# Patient Record
(demographics unavailable — no encounter records)

---

## 2024-11-01 NOTE — DISCUSSION/SUMMARY
[FreeTextEntry1] : This is a 64-year-old male with past medical history significant for bicuspid aortic with moderate aortic valve regurgitation,  enlarged proximal aortic root, mild mitral valve regurgitation, hypertension, occasional dyspnea on exertion, status post aortic valve replacement, and aortic root replacement with bovine prosthesis and endovascular graft, at [facility] In 8/2018, status post recent reduction of his prostate due to BPH (laser electrocautery), who comes in for followup cardiac evaluation.  He denies chest pain, shortness of breath, dizziness or syncope. Exercise stress test done November 1, 2024 demonstrated no evidence of myocardial ischemia; the patient did have an elevated blood pressure response to exercise. I have recommended he discontinue his lisinopril 10 mg daily and start on Micardis 40 mg daily for more sustained blood pressure effect. He is instructed limiting salt intake. The patient had normal exercise stress test November 28, 2023. Echo Doppler examination done November 14, 2023 demonstrated mild mitral valve regurgitation, mild tricuspid valve regurgitation, minimal pulmonic valve regurgitation, left atrial enlargement and borderline concentric left ventricular hypertrophy, normal left ventricular ejection fraction of 63% with a bioprosthetic aortic valve present. Lipid panel done June 19, 2023 demonstrated a cholesterol 136, HDL 43, triglycerides of 190, LDL direct 74, non-HDL cholesterol 93 mg/dL. Patient will continue on his current dose of Crestor 20 mg daily The patient had a normal exercise stress test June 11, 2021. Echo Doppler examination done March 12, 2021 demonstrated mild mitral valve regurgitation, minimal tricuspid valve regurgitation, mild concentric left ventricular hypertrophy, normally functioning bioprosthetic aortic valve with normal left ventricular ejection fraction 66%. He will continue on his current dose of lisinopril 10 mg daily, amlodipine 10 mg, aspirin 81 mg, and Toprol-XL 50 mg/dayincluding Toprol-XL 50 mg/day, lisinopril 10 mg daily, Norvasc 10 mg/day, and Crestor 20 mg/day. The patient has some keloid formation at the sternotomy site. The patient is aware that he must have antibiotic prophylaxis for dental procedures and other invasive procedures. He will have new blood work done today for electrolytes, lipid panel, hemoglobin A1c and SMA 20.  He will follow up with you regarding his medical care.  The patient understands that aerobic exercises must be increased to 40 minutes 4 times per week. A detailed discussion of lifestyle modification was done today. The patient has a good understanding of the diagnosis, and treatment plan. Lifestyle modification was also outlined.

## 2024-11-01 NOTE — PHYSICAL EXAM
[General Appearance - Well Developed] : well developed [Normal Appearance] : normal appearance [Well Groomed] : well groomed [General Appearance - Well Nourished] : well nourished [No Deformities] : no deformities [General Appearance - In No Acute Distress] : no acute distress [Normal Oral Mucosa] : normal oral mucosa [Normal Oropharynx] : normal oropharynx [Normal Jugular Venous A Waves Present] : normal jugular venous A waves present [Normal Jugular Venous V Waves Present] : normal jugular venous V waves present [No Jugular Venous Ng A Waves] : no jugular venous ng A waves [Respiration, Rhythm And Depth] : normal respiratory rhythm and effort [Exaggerated Use Of Accessory Muscles For Inspiration] : no accessory muscle use [Auscultation Breath Sounds / Voice Sounds] : lungs were clear to auscultation bilaterally [Bowel Sounds] : normal bowel sounds [Abdomen Soft] : soft [Abnormal Walk] : normal gait [Gait - Sufficient For Exercise Testing] : the gait was sufficient for exercise testing [Nail Clubbing] : no clubbing of the fingernails [Cyanosis, Localized] : no localized cyanosis [Skin Color & Pigmentation] : normal skin color and pigmentation [Skin Turgor] : normal skin turgor [] : no rash [Affect] : the affect was normal [Oriented To Time, Place, And Person] : oriented to person, place, and time [Mood] : the mood was normal [No Anxiety] : not feeling anxious [II] : a grade 2 [Well Developed] : well developed [Well Nourished] : well nourished [No Acute Distress] : no acute distress [Normal Conjunctiva] : normal conjunctiva [Normal Venous Pressure] : normal venous pressure [No Carotid Bruit] : no carotid bruit [Normal S1, S2] : normal S1, S2 [No Rub] : no rub [No Gallop] : no gallop [5th Left ICS - MCL] : palpated at the 5th LICS in the midclavicular line [Normal] : normal [No Precordial Heave] : no precordial heave was noted [Normal Rate] : normal [Rhythm Regular] : regular [Normal S1] : normal S1 [Normal S2] : normal S2 [I] : a grade 1 [No Pitting Edema] : no pitting edema present [2+] : left 2+ [No Abnormalities] : the abdominal aorta was not enlarged and no bruit was heard [Clear Lung Fields] : clear lung fields [Good Air Entry] : good air entry [No Respiratory Distress] : no respiratory distress  [Soft] : abdomen soft [Non Tender] : non-tender [No Masses/organomegaly] : no masses/organomegaly [Normal Bowel Sounds] : normal bowel sounds [Normal Gait] : normal gait [No Edema] : no edema [No Cyanosis] : no cyanosis [No Clubbing] : no clubbing [No Varicosities] : no varicosities [No Rash] : no rash [No Skin Lesions] : no skin lesions [Moves all extremities] : moves all extremities [No Focal Deficits] : no focal deficits [Normal Speech] : normal speech [Alert and Oriented] : alert and oriented [Normal memory] : normal memory [FreeTextEntry1] : healed sternotomy wound [Apical Thrill] : no thrill palpable at the apex [S3] : no S3 [S4] : no S4 [Right Carotid Bruit] : no bruit heard over the right carotid [Left Carotid Bruit] : no bruit heard over the left carotid [Right Femoral Bruit] : no bruit heard over the right femoral artery [Left Femoral Bruit] : no bruit heard over the left femoral artery

## 2024-11-01 NOTE — REASON FOR VISIT
[CV Risk Factors and Non-Cardiac Disease] : CV risk factors and non-cardiac disease [Structural Heart and Valve Disease] : structural heart and valve disease [Follow-Up - Clinic] : a clinic follow-up of [Hyperlipidemia] : hyperlipidemia [Hypertension] : hypertension [Mitral Regurgitation] : mitral regurgitation [FreeTextEntry3] : Dr. Jensen [FreeTextEntry1] : Bicuspid AV, s/p AVR & aortic root replacement, murmur

## 2024-11-01 NOTE — ASSESSMENT
[FreeTextEntry1] : This is a 63-year-old male with past medical history significant for bicuspid aortic with moderate aortic valve regurgitation, enlarged proximal aortic root, mild mitral valve regurgitation, hypertension, occasional dyspnea on exertion, s/p aortic valve replacement and aortic root replacement with bovine prosthesis and endovascular graft at [facility] in August 2018 who comes in for follow-up cardiac evaluation.  HPI: He is feeling generally well today and denies chest pain, dizziness, heart palpitations, recent episodes of syncope or falls, SOB, or dyspnea at this time. He presents today for a cardiac clearance for scheduled prostate biopsy on 03/202/2024.   Current Medications: Lisinopril 10 mg daily, Amlodipine 10 mg, Aspirin 81 mg, Fish Oil 100 mg daily, Metoprolol Succinate 50 mg daily, and Crestor 20 mg daily   BLOOD PRESSURE: Controlled    BLOOD WORK: -Lipid panel done November 2023 demonstrated triglyceride 237, cholesterol 194, , non-, LDL direct 120. -Lipid panel done June 19, 2023 demonstrated a cholesterol 136, HDL 43, triglycerides of 190, LDL direct 74, non-HDL cholesterol 93 mg/dL.   TESTING/REPORTS: -EKG done 03/06/2024 demonstrated sinus bradycardia rate 51 BPM otherwise remarkable for nonspecific ST-T wave changes.   -The patient had normal exercise stress test November 28, 2023.  -Echo Doppler examination done November 14, 2023 demonstrated mild mitral valve regurgitation, mild tricuspid valve regurgitation, minimal pulmonic valve regurgitation, left atrial enlargement and borderline concentric left ventricular hypertrophy, normal left ventricular ejection fraction of 63% with a bioprosthetic aortic valve present.  -The patient had a normal exercise stress test June 11, 2021.  -Echo Doppler examination done March 12, 2021 demonstrated mild mitral valve regurgitation, minimal tricuspid valve regurgitation, mild concentric left ventricular hypertrophy, normally functioning bioprosthetic aortic valve with normal left ventricular ejection fraction 66%.   *** The patient is clear from a cardiac standpoint. Echocardiogram done November 2023 with ejection fraction 63%. Please avoid overhydration. The patient should be allowed to take their usual medications in the perioperative period. Maintain proper DVT prophylaxis. The patient should have an incentive spirometer in the perioperative period. He may stop his Fish Oil 100 mg daily and Aspirin 81 mg daily 7 days prior to the surgery. Prophylactic antibiotic therapy in the perioperative period to be determined at the discretion of the performing surgeon due to history of aortic valve replacement/aortic root replacement***    PLAN: -He will continue with his usual medications and will contact the office if he is having any complaints between now and their next follow up appointment.   I have discussed the plan of care with JESICA SILVARONE and he will follow up in 3 months. They are compliant with all of their medications.   The patient understands that aerobic exercises must be increased to 40 minutes 4 times/week and a detailed discussion of lifestyle modification was done today.   The patient has a good understanding of the diagnosis, treatment plan and lifestyle modification.   They will contact me at the office for any questions with their care or any changes in their health status.   The patient was discussed with supervision physician Dr. Celso Crawford at the time of the visit and the plan of care will be carried out as noted above.     JUAN MANUEL Lobo NP
Additional Notes: Pared by RN
Detail Level: Simple
Additional Notes: Biopsy results state AK.

## 2025-04-09 NOTE — PHYSICAL EXAM
[FreeTextEntry1] : healed sternotomy wound [Apical Thrill] : no thrill palpable at the apex [S3] : no S3 [S4] : no S4 [Right Carotid Bruit] : no bruit heard over the right carotid [Left Carotid Bruit] : no bruit heard over the left carotid [Right Femoral Bruit] : no bruit heard over the right femoral artery [Left Femoral Bruit] : no bruit heard over the left femoral artery

## 2025-04-09 NOTE — ASSESSMENT
[FreeTextEntry1] : Prior note nurse practitioner Yamil March 6, 2024::  This is a 63-year-old male with past medical history significant for bicuspid aortic with moderate aortic valve regurgitation, enlarged proximal aortic root, mild mitral valve regurgitation, hypertension, occasional dyspnea on exertion, s/p aortic valve replacement and aortic root replacement with bovine prosthesis and endovascular graft at [facility] in August 2018 who comes in for follow-up cardiac evaluation.  HPI: He is feeling generally well today and denies chest pain, dizziness, heart palpitations, recent episodes of syncope or falls, SOB, or dyspnea at this time. He presents today for a cardiac clearance for scheduled prostate biopsy on 03/202/2024.   Current Medications: Lisinopril 10 mg daily, Amlodipine 10 mg, Aspirin 81 mg, Fish Oil 100 mg daily, Metoprolol Succinate 50 mg daily, and Crestor 20 mg daily   BLOOD PRESSURE: Controlled    BLOOD WORK: -Lipid panel done November 2023 demonstrated triglyceride 237, cholesterol 194, , non-, LDL direct 120. -Lipid panel done June 19, 2023 demonstrated a cholesterol 136, HDL 43, triglycerides of 190, LDL direct 74, non-HDL cholesterol 93 mg/dL.   TESTING/REPORTS: -EKG done 03/06/2024 demonstrated sinus bradycardia rate 51 BPM otherwise remarkable for nonspecific ST-T wave changes.   -The patient had normal exercise stress test November 28, 2023.  -Echo Doppler examination done November 14, 2023 demonstrated mild mitral valve regurgitation, mild tricuspid valve regurgitation, minimal pulmonic valve regurgitation, left atrial enlargement and borderline concentric left ventricular hypertrophy, normal left ventricular ejection fraction of 63% with a bioprosthetic aortic valve present.  -The patient had a normal exercise stress test June 11, 2021.  -Echo Doppler examination done March 12, 2021 demonstrated mild mitral valve regurgitation, minimal tricuspid valve regurgitation, mild concentric left ventricular hypertrophy, normally functioning bioprosthetic aortic valve with normal left ventricular ejection fraction 66%.   *** The patient is clear from a cardiac standpoint. Echocardiogram done November 2023 with ejection fraction 63%. Please avoid overhydration. The patient should be allowed to take their usual medications in the perioperative period. Maintain proper DVT prophylaxis. The patient should have an incentive spirometer in the perioperative period. He may stop his Fish Oil 100 mg daily and Aspirin 81 mg daily 7 days prior to the surgery. Prophylactic antibiotic therapy in the perioperative period to be determined at the discretion of the performing surgeon due to history of aortic valve replacement/aortic root replacement***    PLAN: -He will continue with his usual medications and will contact the office if he is having any complaints between now and their next follow up appointment.   I have discussed the plan of care with JESICA PAZ and he will follow up in 3 months. They are compliant with all of their medications.   The patient understands that aerobic exercises must be increased to 40 minutes 4 times/week and a detailed discussion of lifestyle modification was done today.   The patient has a good understanding of the diagnosis, treatment plan and lifestyle modification.   They will contact me at the office for any questions with their care or any changes in their health status.   The patient was discussed with supervision physician Dr. Celso Crawford at the time of the visit and the plan of care will be carried out as noted above.     JUAN MANUEL Lobo NP

## 2025-04-09 NOTE — DISCUSSION/SUMMARY
[FreeTextEntry1] : This is a 64-year-old male with past medical history significant for bicuspid aortic with moderate aortic valve regurgitation,  enlarged proximal aortic root, mild mitral valve regurgitation, hypertension, occasional dyspnea on exertion, status post aortic valve replacement, and aortic root replacement with bovine prosthesis and endovascular graft, at [facility] In 8/2018, status post recent reduction of his prostate due to BPH (laser electrocautery), who comes in for followup cardiac evaluation.  He denies chest pain, shortness of breath, dizziness or syncope. Electrocardiogram done April 8, 2025 demonstrated sinus bradycardia rate of 49 bpm is otherwise remarkable for T wave inversions in lead I, aVL, V6, left axis deviation. I have recommend this patient schedule nuclear stress test to evaluate his coronary artery perfusion and rule out significant coronary artery disease. He will have new blood work done for lipid panel SMA 20, hemoglobin A1c Exercise stress test done November 1, 2024 demonstrated no evidence of myocardial ischemia; the patient did have an elevated blood pressure response to exercise. I have recommended he discontinue his lisinopril 10 mg daily and start on Micardis 40 mg daily for more sustained blood pressure effect. He is instructed limiting salt intake. The patient had normal exercise stress test November 28, 2023. Echo Doppler examination done November 14, 2023 demonstrated mild mitral valve regurgitation, mild tricuspid valve regurgitation, minimal pulmonic valve regurgitation, left atrial enlargement and borderline concentric left ventricular hypertrophy, normal left ventricular ejection fraction of 63% with a bioprosthetic aortic valve present. Lipid panel done June 19, 2023 demonstrated a cholesterol 136, HDL 43, triglycerides of 190, LDL direct 74, non-HDL cholesterol 93 mg/dL. Patient will continue on his current dose of Crestor 20 mg daily The patient had a normal exercise stress test June 11, 2021. Echo Doppler examination done March 12, 2021 demonstrated mild mitral valve regurgitation, minimal tricuspid valve regurgitation, mild concentric left ventricular hypertrophy, normally functioning bioprosthetic aortic valve with normal left ventricular ejection fraction 66%. He will continue on his current dose of lisinopril 10 mg daily, amlodipine 10 mg, aspirin 81 mg, and Toprol-XL 50 mg/dayincluding Toprol-XL 50 mg/day, lisinopril 10 mg daily, Norvasc 10 mg/day, and Crestor 20 mg/day. The patient has some keloid formation at the sternotomy site. The patient is aware that he must have antibiotic prophylaxis for dental procedures and other invasive procedures. He will have new blood work done today for electrolytes, lipid panel, hemoglobin A1c and SMA 20.  He will follow up with you regarding his medical care.  The patient understands that aerobic exercises must be increased to 40 minutes 4 times per week. A detailed discussion of lifestyle modification was done today. The patient has a good understanding of the diagnosis, and treatment plan. Lifestyle modification was also outlined.

## 2025-04-09 NOTE — DISCUSSION/SUMMARY
[FreeTextEntry1] : This is a 64-year-old male with past medical history significant for bicuspid aortic with moderate aortic valve regurgitation,  enlarged proximal aortic root, mild mitral valve regurgitation, hypertension, occasional dyspnea on exertion, status post aortic valve replacement, and aortic root replacement with bovine prosthesis and endovascular graft, at [facility] In 8/2018, status post recent reduction of his prostate due to BPH (laser electrocautery), who comes in for followup cardiac evaluation.  He denies chest pain, shortness of breath, dizziness or syncope. Electrocardiogram done April 8, 2025 demonstrated sinus bradycardia rate of 49 bpm is otherwise remarkable for T wave inversions in lead I, aVL, V6, left axis deviation. I have recommend this patient schedule nuclear stress test to evaluate his coronary artery perfusion and rule out significant coronary artery disease. He will have new blood work done for lipid panel SMA 20, hemoglobin A1c Exercise stress test done November 1, 2024 demonstrated no evidence of myocardial ischemia; the patient did have an elevated blood pressure response to exercise. I have recommended he discontinue his lisinopril 10 mg daily and start on Micardis 40 mg daily for more sustained blood pressure effect. He is instructed limiting salt intake. The patient had normal exercise stress test November 28, 2023. Echo Doppler examination done November 14, 2023 demonstrated mild mitral valve regurgitation, mild tricuspid valve regurgitation, minimal pulmonic valve regurgitation, left atrial enlargement and borderline concentric left ventricular hypertrophy, normal left ventricular ejection fraction of 63% with a bioprosthetic aortic valve present. Lipid panel done June 19, 2023 demonstrated a cholesterol 136, HDL 43, triglycerides of 190, LDL direct 74, non-HDL cholesterol 93 mg/dL. Patient will continue on his current dose of Crestor 20 mg daily The patient had a normal exercise stress test June 11, 2021. Echo Doppler examination done March 12, 2021 demonstrated mild mitral valve regurgitation, minimal tricuspid valve regurgitation, mild concentric left ventricular hypertrophy, normally functioning bioprosthetic aortic valve with normal left ventricular ejection fraction 66%. He will continue on his current dose of lisinopril 10 mg daily, amlodipine 10 mg, aspirin 81 mg, and Toprol-XL 50 mg/dayincluding Toprol-XL 50 mg/day, lisinopril 10 mg daily, Norvasc 10 mg/day, and Crestor 20 mg/day. The patient has some keloid formation at the sternotomy site. The patient is aware that he must have antibiotic prophylaxis for dental procedures and other invasive procedures. He will have new blood work done today for electrolytes, lipid panel, hemoglobin A1c and SMA 20.  He will follow up with you regarding his medical care.  The patient understands that aerobic exercises must be increased to 40 minutes 4 times per week. A detailed discussion of lifestyle modification was done today. The patient has a good understanding of the diagnosis, and treatment plan. Lifestyle modification was also outlined.

## 2025-04-09 NOTE — ASSESSMENT
[FreeTextEntry1] : Prior note nurse practitioner Yamil March 6, 2024::  This is a 63-year-old male with past medical history significant for bicuspid aortic with moderate aortic valve regurgitation, enlarged proximal aortic root, mild mitral valve regurgitation, hypertension, occasional dyspnea on exertion, s/p aortic valve replacement and aortic root replacement with bovine prosthesis and endovascular graft at [facility] in August 2018 who comes in for follow-up cardiac evaluation.  HPI: He is feeling generally well today and denies chest pain, dizziness, heart palpitations, recent episodes of syncope or falls, SOB, or dyspnea at this time. He presents today for a cardiac clearance for scheduled prostate biopsy on 03/202/2024.   Current Medications: Lisinopril 10 mg daily, Amlodipine 10 mg, Aspirin 81 mg, Fish Oil 100 mg daily, Metoprolol Succinate 50 mg daily, and Crestor 20 mg daily   BLOOD PRESSURE: Controlled    BLOOD WORK: -Lipid panel done November 2023 demonstrated triglyceride 237, cholesterol 194, , non-, LDL direct 120. -Lipid panel done June 19, 2023 demonstrated a cholesterol 136, HDL 43, triglycerides of 190, LDL direct 74, non-HDL cholesterol 93 mg/dL.   TESTING/REPORTS: -EKG done 03/06/2024 demonstrated sinus bradycardia rate 51 BPM otherwise remarkable for nonspecific ST-T wave changes.   -The patient had normal exercise stress test November 28, 2023.  -Echo Doppler examination done November 14, 2023 demonstrated mild mitral valve regurgitation, mild tricuspid valve regurgitation, minimal pulmonic valve regurgitation, left atrial enlargement and borderline concentric left ventricular hypertrophy, normal left ventricular ejection fraction of 63% with a bioprosthetic aortic valve present.  -The patient had a normal exercise stress test June 11, 2021.  -Echo Doppler examination done March 12, 2021 demonstrated mild mitral valve regurgitation, minimal tricuspid valve regurgitation, mild concentric left ventricular hypertrophy, normally functioning bioprosthetic aortic valve with normal left ventricular ejection fraction 66%.   *** The patient is clear from a cardiac standpoint. Echocardiogram done November 2023 with ejection fraction 63%. Please avoid overhydration. The patient should be allowed to take their usual medications in the perioperative period. Maintain proper DVT prophylaxis. The patient should have an incentive spirometer in the perioperative period. He may stop his Fish Oil 100 mg daily and Aspirin 81 mg daily 7 days prior to the surgery. Prophylactic antibiotic therapy in the perioperative period to be determined at the discretion of the performing surgeon due to history of aortic valve replacement/aortic root replacement***    PLAN: -He will continue with his usual medications and will contact the office if he is having any complaints between now and their next follow up appointment.   I have discussed the plan of care with JESICA APZ and he will follow up in 3 months. They are compliant with all of their medications.   The patient understands that aerobic exercises must be increased to 40 minutes 4 times/week and a detailed discussion of lifestyle modification was done today.   The patient has a good understanding of the diagnosis, treatment plan and lifestyle modification.   They will contact me at the office for any questions with their care or any changes in their health status.   The patient was discussed with supervision physician Dr. Celso Crawford at the time of the visit and the plan of care will be carried out as noted above.     JUAN MANUEL Lobo NP

## 2025-04-09 NOTE — REASON FOR VISIT
[FreeTextEntry3] : Dr. Jensen [FreeTextEntry1] : Bicuspid AV, s/p AVR & aortic root replacement, murmur